# Patient Record
Sex: FEMALE | Race: WHITE
[De-identification: names, ages, dates, MRNs, and addresses within clinical notes are randomized per-mention and may not be internally consistent; named-entity substitution may affect disease eponyms.]

---

## 2017-10-25 ENCOUNTER — HOSPITAL ENCOUNTER (OUTPATIENT)
Dept: HOSPITAL 39 - MAMMO | Age: 82
Discharge: HOME | End: 2017-10-25
Payer: COMMERCIAL

## 2017-10-25 DIAGNOSIS — Z12.31: Primary | ICD-10-CM

## 2018-03-05 ENCOUNTER — HOSPITAL ENCOUNTER (OUTPATIENT)
Dept: HOSPITAL 39 - AMB | Age: 83
Discharge: HOME | End: 2018-03-05
Attending: OPHTHALMOLOGY
Payer: COMMERCIAL

## 2018-03-05 VITALS — SYSTOLIC BLOOD PRESSURE: 119 MMHG | DIASTOLIC BLOOD PRESSURE: 62 MMHG | OXYGEN SATURATION: 96 % | TEMPERATURE: 97.4 F

## 2018-03-05 DIAGNOSIS — I10: ICD-10-CM

## 2018-03-05 DIAGNOSIS — Z79.899: ICD-10-CM

## 2018-03-05 DIAGNOSIS — Z88.0: ICD-10-CM

## 2018-03-05 DIAGNOSIS — H25.11: Primary | ICD-10-CM

## 2018-03-05 DIAGNOSIS — Z79.82: ICD-10-CM

## 2018-03-05 PROCEDURE — 00142 ANES PX ON EYE LENS SURGERY: CPT

## 2018-03-05 PROCEDURE — 66984 XCAPSL CTRC RMVL W/O ECP: CPT

## 2018-03-05 RX ADMIN — TOBRAMYCIN ONE DROP: 3 SOLUTION/ DROPS OPHTHALMIC at 11:01

## 2018-03-05 RX ADMIN — TOBRAMYCIN ONE DROP: 3 SOLUTION/ DROPS OPHTHALMIC at 10:04

## 2018-03-19 ENCOUNTER — HOSPITAL ENCOUNTER (OUTPATIENT)
Dept: HOSPITAL 39 - AMB | Age: 83
Discharge: HOME | End: 2018-03-19
Attending: OPHTHALMOLOGY
Payer: COMMERCIAL

## 2018-03-19 VITALS — OXYGEN SATURATION: 98 %

## 2018-03-19 VITALS — TEMPERATURE: 98.1 F | DIASTOLIC BLOOD PRESSURE: 68 MMHG | SYSTOLIC BLOOD PRESSURE: 146 MMHG

## 2018-03-19 DIAGNOSIS — H26.492: Primary | ICD-10-CM

## 2018-03-19 RX ADMIN — PROPARACAINE HYDROCHLORIDE ONE DROP: 5 SOLUTION/ DROPS OPHTHALMIC at 11:19

## 2018-03-19 RX ADMIN — PROPARACAINE HYDROCHLORIDE ONE DROP: 5 SOLUTION/ DROPS OPHTHALMIC at 11:08

## 2018-10-30 ENCOUNTER — HOSPITAL ENCOUNTER (OUTPATIENT)
Dept: HOSPITAL 39 - MAMMO | Age: 83
End: 2018-10-30
Attending: FAMILY MEDICINE
Payer: COMMERCIAL

## 2018-10-30 DIAGNOSIS — Z12.31: Primary | ICD-10-CM

## 2019-11-01 ENCOUNTER — HOSPITAL ENCOUNTER (OUTPATIENT)
Dept: HOSPITAL 39 - MAMMO | Age: 84
End: 2019-11-01
Attending: FAMILY MEDICINE
Payer: MEDICARE

## 2019-11-01 DIAGNOSIS — Z12.31: Primary | ICD-10-CM

## 2019-11-04 NOTE — MAM
EXAM DESCRIPTION: 

3D Screening BILATERAL : Digital Mammography.



CLINICAL HISTORY: 

85 years Female ANNUAL SCREENING . No complaints. No personal or

family history of breast cancer. Menarche age 13. Childbirth.

Hysterectomy 40+ years ago. Currently on HRT.  Lifetime risk of

developing breast cancer (Tyrer-Cuzick model)(%):  1.1.



COMPARISON: 

Bilateral screening digital breast tomosynthesis 30 October 2018

and 2-D digital screening bilateral mammography 25 October 2017. 

  



TECHNIQUE: 

Bilateral CC and MLO projection full-field images, digital

tomosynthesis mammographic technique. Bilateral digital 2-D

full-field MLO images.  CAD not available for tomosynthesis or

2-D images.  



FINDINGS: 

The breast parenchymal density pattern is: Scattered areas of

fibroglandular density. No skin thickening or nipple retraction. 

Bilateral solitary microcalcifications associated with

fibroglandular tissues, more on the left. Stable group of

microcalcifications in the middle third of the lateral left

breast. Vague mass density or focal asymmetry, and new finding,

approximately 4 cm from the nipple at the 1:00 position of the

middle third of the right breast. Not associated with

microcalcifications.

No new focal, stellate mass or density, focal asymmetry , and no

suspicious microcalcifications left breast. 



IMPRESSION: 

BI-RADS CATEGORY: 0 - INCOMPLETE- Need additional imaging

evaluation.

FOLLOW-UP: Recall for additional imaging: Right breast full-field

LM projection tomosynthesis and 2-D technique. Spot compression

right breast tomosynthesis region of interest. Directed right

breast ultrasound region of interest..



Written communication concerning the IMPRESSION and Follow-up,

will be mailed to the patient and referring health care provider.



Electronically signed by:  Geoffrey Chavarria MD  11/4/2019 11:38 AM

[facility] Workstation: [id number]

## 2019-11-20 ENCOUNTER — HOSPITAL ENCOUNTER (OUTPATIENT)
Dept: HOSPITAL 39 - US | Age: 84
End: 2019-11-20
Attending: FAMILY MEDICINE
Payer: MEDICARE

## 2019-11-20 DIAGNOSIS — R92.8: Primary | ICD-10-CM

## 2019-11-20 PROCEDURE — 76641 ULTRASOUND BREAST COMPLETE: CPT

## 2019-11-20 PROCEDURE — 77066 DX MAMMO INCL CAD BI: CPT

## 2019-11-21 NOTE — US
EXAM DESCRIPTION: 

3D Diagnostic, Bilateral (accession [id number]), Breast,Right

(accession [id number]): Ultrasound



CLINICAL HISTORY: 

86 yearsFemaleABN MAMMO mass density versus focal asymmetry

anterior mid right breast Lifetime risk of developing breast

cancer (Tyrer-Cuzick model)(%):  1.1.



COMPARISON: 

Other



TECHNIQUE: 

Bilateral LM projection full-field images, and right CC spot

compression images, digital tomosynthesis technique. Bilateral

2-D digital full-field images.  LM, and right breast CC spot

compression. CAD not available. .  Transcutaneous scanning of the

right breast utilizing gray-scale and Doppler modes. Scanning

performed by the sonographer and Dr. Chavarria.



FINDINGS: 

The breast parenchymal density pattern is:  Scattered areas of

fibroglandular density. No skin thickening or nipple retraction 

small calcifications. Focal asymmetry or mass density not well

seen on the compression 2-D and tomosynthesis images.

No suspicious microcalcifications bilaterally. 



Ultrasound: Scanning upper-outer quadrant of the mid right

breast. Mixture of fibroglandular and fatty changes. Specifically

scanning at the 1:00 position 4 cm from the nipple.

Fibroglandular tissues with minimal fat. No dominant solid mass

or simple cyst. No parenchymal edema or large calcifications or

overlying skin changes.



IMPRESSION: 

Benign exam.



BIRAD CATEGORY: 2 BENIGN FINDINGS.



RECOMMENDATIONS:

FOLLOW UP: Return to routine digital bilateral  mammographic

screening, one year interval from  November 2019.



Written communication explaining the IMPRESSION and follow-up,

will be mailed to the patient and referring health care provider.

The FINDINGS  and the FOLLOW-UP plan were reviewed in person with

the patient after the examination.



According to the [employer], yearly mammograms

are recommended starting at age 40 and continuing as long as a

woman is in good health.  Any breast change noted on a breast

self-exam should be reported promptly to the patient's healthcare

provider.  Breast MRI is recommended for women with an

approximately 20-25% or greater lifetime risk of breast cancer,

including women with a strong family history of breast or ovarian

cancer and women who have been treated for Hodgkin's disease.



A negative mammographic report should not delay tissue diagnosis

in patients with significant clinical history or physical

findings.



Extremely dense breast tissue limits the sensitivity of digital

mammography. 



Electronically signed by:  Geoffrey Chavarria MD  11/21/2019 9:04 AM

[facility] Workstation: [id number]

## 2019-11-21 NOTE — MAM
EXAM DESCRIPTION: 

3D Diagnostic, Bilateral (accession [id number]), Breast,Right

(accession [id number]): Ultrasound



CLINICAL HISTORY: 

86 yearsFemaleABN MAMMO mass density versus focal asymmetry

anterior mid right breast Lifetime risk of developing breast

cancer (Tyrer-Cuzick model)(%):  1.1.



COMPARISON: 

Other



TECHNIQUE: 

Bilateral LM projection full-field images, and right CC spot

compression images, digital tomosynthesis technique. Bilateral

2-D digital full-field images.  LM, and right breast CC spot

compression. CAD not available. .  Transcutaneous scanning of the

right breast utilizing gray-scale and Doppler modes. Scanning

performed by the sonographer and Dr. Chavarria.



FINDINGS: 

The breast parenchymal density pattern is:  Scattered areas of

fibroglandular density. No skin thickening or nipple retraction 

small calcifications. Focal asymmetry or mass density not well

seen on the compression 2-D and tomosynthesis images.

No suspicious microcalcifications bilaterally. 



Ultrasound: Scanning upper-outer quadrant of the mid right

breast. Mixture of fibroglandular and fatty changes. Specifically

scanning at the 1:00 position 4 cm from the nipple.

Fibroglandular tissues with minimal fat. No dominant solid mass

or simple cyst. No parenchymal edema or large calcifications or

overlying skin changes.



IMPRESSION: 

Benign exam.



BIRAD CATEGORY: 2 BENIGN FINDINGS.



RECOMMENDATIONS:

FOLLOW UP: Return to routine digital bilateral  mammographic

screening, one year interval from  November 2019.



Written communication explaining the IMPRESSION and follow-up,

will be mailed to the patient and referring health care provider.

The FINDINGS  and the FOLLOW-UP plan were reviewed in person with

the patient after the examination.



According to the [employer], yearly mammograms

are recommended starting at age 40 and continuing as long as a

woman is in good health.  Any breast change noted on a breast

self-exam should be reported promptly to the patient's healthcare

provider.  Breast MRI is recommended for women with an

approximately 20-25% or greater lifetime risk of breast cancer,

including women with a strong family history of breast or ovarian

cancer and women who have been treated for Hodgkin's disease.



A negative mammographic report should not delay tissue diagnosis

in patients with significant clinical history or physical

findings.



Extremely dense breast tissue limits the sensitivity of digital

mammography. 



Electronically signed by:  Geoffrey Chavarria MD  11/21/2019 9:04 AM

[facility] Workstation: [id number]

## 2021-01-13 ENCOUNTER — HOSPITAL ENCOUNTER (OUTPATIENT)
Dept: HOSPITAL 39 - GMAE | Age: 86
End: 2021-01-13
Attending: FAMILY MEDICINE
Payer: MEDICARE

## 2021-01-13 DIAGNOSIS — I10: ICD-10-CM

## 2021-01-13 DIAGNOSIS — E03.9: Primary | ICD-10-CM
